# Patient Record
Sex: MALE | Race: BLACK OR AFRICAN AMERICAN | URBAN - METROPOLITAN AREA
[De-identification: names, ages, dates, MRNs, and addresses within clinical notes are randomized per-mention and may not be internally consistent; named-entity substitution may affect disease eponyms.]

---

## 2021-03-02 ENCOUNTER — HISTORICAL (OUTPATIENT)
Dept: ADMINISTRATIVE | Facility: HOSPITAL | Age: 31
End: 2021-03-02

## 2021-03-02 LAB — SARS-COV-2 RNA RESP QL NAA+PROBE: NOT DETECTED

## 2021-03-04 ENCOUNTER — HISTORICAL (OUTPATIENT)
Dept: SURGERY | Facility: HOSPITAL | Age: 31
End: 2021-03-04

## 2021-03-19 ENCOUNTER — HISTORICAL (OUTPATIENT)
Dept: ADMINISTRATIVE | Facility: HOSPITAL | Age: 31
End: 2021-03-19

## 2021-04-19 ENCOUNTER — HISTORICAL (OUTPATIENT)
Dept: ADMINISTRATIVE | Facility: HOSPITAL | Age: 31
End: 2021-04-19

## 2022-04-11 ENCOUNTER — HISTORICAL (OUTPATIENT)
Dept: ADMINISTRATIVE | Facility: HOSPITAL | Age: 32
End: 2022-04-11

## 2022-04-27 VITALS
SYSTOLIC BLOOD PRESSURE: 136 MMHG | HEIGHT: 71 IN | DIASTOLIC BLOOD PRESSURE: 88 MMHG | BODY MASS INDEX: 20.96 KG/M2 | WEIGHT: 149.69 LBS

## 2022-04-30 NOTE — H&P
Patient:   Doc Scales            MRN: [id number]            FIN: [id number]               Age:   30 years     Sex:  Male     :  1990   Associated Diagnoses:   None   Author:   Arron Dexter MD      Patient presents today for surgery. There are no changes to history and physical documented at previous clinic appointment.    Patient will undergo surgery as detailed in formal history and physical.      Arron Dexter MD

## 2022-04-30 NOTE — PROGRESS NOTES
Patient:   Doc Scales            MRN: [id number]            FIN: [id number]               Age:   30 years     Sex:  Male     :  1990   Associated Diagnoses:   None   Author:   Dewey Epperson MD      Patient came to clinic today for assessment of his right hand.  He is now approximately 4 weeks status post open reduction and percutaneous pinning of his right fourth and fifth carpometacarpal fracture/dislocation.  He reports that over the last couple days he felt as though his splint had come loose.  There was sliding on his arm.  A new splint was applied to the patient's arm in clinic today.

## 2022-04-30 NOTE — OP NOTE
DATE OF SURGERY:    03/04/2021    SURGEON:  Tom De Leon MD    attending physician:  Dewey Epperson MD    PREOPERATIVE DIAGNOSES:    1. Fracture-dislocation, right 4th metacarpal.  2. Dislocation, right 5th metacarpal.    POSTOPERATIVE DIAGNOSES:    1. Fracture-dislocation, right 4th metacarpal.  2. Dislocation, right 5th metacarpal.    PROCEDURES:    1. Open reduction, percutaneous fixation, right fracture-dislocation 4th metacarpal.  2. Open reduction, percutaneous fixation, right 5th metacarpal dislocation.    INDICATION FOR PROCEDURE:  Doc Scales is a 30-year-old male who struck an object by punching and he suffered a terrible dislocation of 4th and 5th metacarpals with a fracture of the 4th metacarpal which is displaced and non-healed.  He presents for repair.    ANESTHESIA:  General.    COMPLICATIONS:  None.    PROCEDURE IN DETAIL:  The patient was endotracheally intubated, prepped and draped in the usual sterile fashion.  Esmarch was used to exsanguinate the right upper extremity.  Tourniquet was inflated to 250 mmHg.  I first began by making a dorsal incision along the 4th and 5th metacarpals using a 15 blade scalpel.  The soft tissue was dissected sharply.  There was a large amount of callus which was removed from the 4th and 5th metacarpal space as it articulated with the carpal bones.  The fracture-dislocation was reduced under manual compression, and in a similar fashion, under fluoroscopic guidance, the callus and debris were removed from the 5th metacarpal fracture-dislocation under open control.  We then reduced these and placed two 0.045 K-wires in a retrograde fashion to reduce and fixate percutaneously the 4th and 5th metacarpal fracture-dislocation.  After this was completed, we then bent the K-wires, closed the incision using nylons.  Sterile dressing was applied.  The tourniquet was released.  Fingers were pink at the end of the case.         I was scrubbed and present for the  entire procedure.        ______________________________  MD ANDRES León/CRISTI  DD:  03/04/2021  Time:  11:05AM  DT:  03/04/2021  Time:  11:26AM  Job #:  [id number]

## 2024-04-12 ENCOUNTER — HOSPITAL ENCOUNTER (EMERGENCY)
Facility: HOSPITAL | Age: 34
Discharge: HOME | End: 2024-04-12
Attending: FAMILY MEDICINE

## 2024-04-12 VITALS
WEIGHT: 150 LBS | HEART RATE: 55 BPM | RESPIRATION RATE: 18 BRPM | SYSTOLIC BLOOD PRESSURE: 126 MMHG | DIASTOLIC BLOOD PRESSURE: 89 MMHG | BODY MASS INDEX: 21 KG/M2 | OXYGEN SATURATION: 98 % | TEMPERATURE: 98 F

## 2024-04-12 DIAGNOSIS — S53.105A ELBOW DISLOCATION, LEFT, INITIAL ENCOUNTER: Primary | ICD-10-CM

## 2024-04-12 DIAGNOSIS — M25.529 ELBOW PAIN: ICD-10-CM

## 2024-04-12 PROCEDURE — 63600175 PHARM REV CODE 636 W HCPCS: Performed by: FAMILY MEDICINE

## 2024-04-12 PROCEDURE — 99285 EMERGENCY DEPT VISIT HI MDM: CPT | Mod: 25

## 2024-04-12 PROCEDURE — 96374 THER/PROPH/DIAG INJ IV PUSH: CPT

## 2024-04-12 PROCEDURE — 96361 HYDRATE IV INFUSION ADD-ON: CPT

## 2024-04-12 PROCEDURE — 24600 TX CLSD ELBOW DISLC W/O ANES: CPT | Mod: LT

## 2024-04-12 PROCEDURE — 25000003 PHARM REV CODE 250: Performed by: FAMILY MEDICINE

## 2024-04-12 RX ORDER — MORPHINE SULFATE 4 MG/ML
4 INJECTION, SOLUTION INTRAMUSCULAR; INTRAVENOUS
Status: COMPLETED | OUTPATIENT
Start: 2024-04-12 | End: 2024-04-12

## 2024-04-12 RX ORDER — ONDANSETRON HYDROCHLORIDE 2 MG/ML
4 INJECTION, SOLUTION INTRAVENOUS
Status: COMPLETED | OUTPATIENT
Start: 2024-04-12 | End: 2024-04-12

## 2024-04-12 RX ORDER — HYDROCODONE BITARTRATE AND ACETAMINOPHEN 7.5; 325 MG/1; MG/1
1 TABLET ORAL EVERY 6 HOURS PRN
Qty: 15 TABLET | Refills: 0 | Status: SHIPPED | OUTPATIENT
Start: 2024-04-12

## 2024-04-12 RX ORDER — ETOMIDATE 2 MG/ML
10 INJECTION INTRAVENOUS
Status: COMPLETED | OUTPATIENT
Start: 2024-04-12 | End: 2024-04-12

## 2024-04-12 RX ORDER — IBUPROFEN 800 MG/1
800 TABLET ORAL EVERY 8 HOURS PRN
Qty: 30 TABLET | Refills: 0 | Status: SHIPPED | OUTPATIENT
Start: 2024-04-12 | End: 2024-04-22

## 2024-04-12 RX ADMIN — MORPHINE SULFATE 4 MG: 4 INJECTION, SOLUTION INTRAMUSCULAR; INTRAVENOUS at 05:04

## 2024-04-12 RX ADMIN — SODIUM CHLORIDE 1000 ML: 9 INJECTION, SOLUTION INTRAVENOUS at 05:04

## 2024-04-12 RX ADMIN — ETOMIDATE 10 MG: 2 INJECTION INTRAVENOUS at 05:04

## 2024-04-12 RX ADMIN — ETOMIDATE 10 MG: 2 INJECTION INTRAVENOUS at 06:04

## 2024-04-12 RX ADMIN — ONDANSETRON 4 MG: 2 INJECTION INTRAMUSCULAR; INTRAVENOUS at 05:04

## 2024-04-12 NOTE — Clinical Note
"Doc TREJO" Yordy was seen and treated in our emergency department on 4/12/2024.  He may return to work on 04/15/2024.       If you have any questions or concerns, please don't hesitate to call.       RN    "

## 2024-04-12 NOTE — ED PROVIDER NOTES
Encounter Date: 4/12/2024       History     Chief Complaint   Patient presents with    Joint Swelling     Transfer from [facility] with L elbow fracture     Patient is a 33-year-old gentleman presents room transferred from [facility] for orthopedic evaluation.  Patient was sustained an injury dislocated in his left elbow, they were concerned because reports associated fracture.    The history is provided by the patient.     Review of patient's allergies indicates:  No Known Allergies  History reviewed. No pertinent past medical history.  History reviewed. No pertinent surgical history.  History reviewed. No pertinent family history.     Review of Systems   Constitutional:  Negative for chills, fatigue and fever.   HENT:  Negative for ear pain, rhinorrhea and sore throat.    Eyes:  Negative for photophobia and pain.   Respiratory:  Negative for cough, shortness of breath and wheezing.    Cardiovascular:  Negative for chest pain.   Gastrointestinal:  Negative for abdominal pain, diarrhea, nausea and vomiting.   Genitourinary:  Negative for dysuria.   Neurological:  Negative for dizziness, weakness and headaches.   All other systems reviewed and are negative.      Physical Exam     Initial Vitals [04/12/24 0451]   BP Pulse Resp Temp SpO2   127/83 (!) 54 16 98.2 °F (36.8 °C) (!) 94 %      MAP       --         Physical Exam    Nursing note and vitals reviewed.  Constitutional: He appears well-developed and well-nourished.   HENT:   Head: Normocephalic and atraumatic.   Mouth/Throat: Oropharynx is clear and moist.   Eyes: EOM are normal. Pupils are equal, round, and reactive to light.   Neck: Neck supple.   Normal range of motion.  Cardiovascular:  Normal rate, regular rhythm, normal heart sounds and intact distal pulses.     Exam reveals no gallop and no friction rub.       No murmur heard.  Pulmonary/Chest: Breath sounds normal. No respiratory distress.   Abdominal: Abdomen is soft. Bowel sounds are normal. He exhibits  no distension. There is no abdominal tenderness.   Musculoskeletal:         General: Normal range of motion.      Cervical back: Normal range of motion and neck supple.      Comments: Currently in sling     Neurological: He is alert and oriented to person, place, and time. He has normal strength.   Skin: Skin is warm and dry.   Psychiatric: He has a normal mood and affect. His behavior is normal. Judgment and thought content normal.         ED Course   Orthopedic Injury    Date/Time: 4/12/2024 6:09 AM    Performed by: Humberto Ledbetter MD  Authorized by: Humberto Ledbetter MD    Location procedure was performed:  [facility] EMERGENCY DEPARTMENT  Injury:     Injury location:  Elbow    Injury type:  Fracture      Pre-procedure assessment:     Distal perfusion: normal      Neurological function: normal      Range of motion: normal      Patient sedated?: Yes      ASA Class:  Class 1 - Heathy patient. No medical history.    Mallampati Score:  Class 1 - Visualization of the soft palate, fauces, uvula, and anterior/posterior pillars.  Date/Time of last solid:  4/11/2024 11:00 PM    Date/Time of last fluid:  4/11/2024 11:00 PM    Patient/Family history of anesthesia or sedation complications: Yes      Sedation type: moderate (conscious) sedation      Sedation:  Etomidate    Analgesia:  Morphine      Selections made in this section will also lock the Injury type section above.:     Manipulation performed?: Yes      Skeletal traction used?: Yes      Reduction successful?: Yes      Immobilization:  Splint    Splint type: posterior long arm.  Post-procedure assessment:     Distal perfusion: normal      Neurological function: normal      Labs Reviewed - No data to display       Imaging Results              X-Ray Elbow Complete Left (Preliminary result)  Result time 04/12/24 06:11:43      Wet Read by Humberto Ledbetter MD (04/12/24 06:11:43, [facility] - Emergency Dept, Emergency Medicine)    Successful  reduction                                     Medications   sodium chloride 0.9% bolus 1,000 mL 1,000 mL (1,000 mLs Intravenous New Bag 4/12/24 0554)   etomidate injection 10 mg (has no administration in time range)   etomidate injection 10 mg (10 mg Intravenous Given 4/12/24 0553)   ondansetron injection 4 mg (4 mg Intravenous Given 4/12/24 0546)   morphine injection 4 mg (4 mg Intravenous Given 4/12/24 0546)     Medical Decision Making  33-year-old gentleman presenting to the emergency room from [facility] with a left elbow dislocation; discussed with Dr. Riggs who has reviewed the images.  Recommends reduction and outpatient followup in clinic.    Amount and/or Complexity of Data Reviewed  Radiology: ordered.    Risk  Prescription drug management.               ED Course as of 04/12/24 0612 Fri Apr 12, 2024   0536 Discussed with Dr. Riggs.  Recommends reduction and outpatient followup. [MW]   0611 Successful reduction.  Stable for discharge to home.  Will followup outpatient with Ortho. [MW]      ED Course User Index  [MW] Humberto Ledbetter MD                           Clinical Impression:  Final diagnoses:  [S53.105A] Elbow dislocation, left, initial encounter (Primary)  [M25.529] Elbow pain  [M25.529] Elbow pain - post reduction          ED Disposition Condition    Discharge Stable          ED Prescriptions       Medication Sig Dispense Start Date End Date Auth. Provider    HYDROcodone-acetaminophen (NORCO) 7.5-325 mg per tablet Take 1 tablet by mouth every 6 (six) hours as needed for Pain. 15 tablet 4/12/2024 -- Humberto Ledbetter MD    ibuprofen (ADVIL,MOTRIN) 800 MG tablet Take 1 tablet (800 mg total) by mouth every 8 (eight) hours as needed for Pain. 30 tablet 4/12/2024 4/22/2024 Humberto Ledbetter MD          Follow-up Information       Follow up With Specialties Details Why Contact Reymundo Ryder MD Orthopedic Surgery  Call to schedule followup appointment. [address]  [facility] [id number]  [phone]      [facility] - Emergency Dept Emergency Medicine  As needed, If symptoms worsen [address] [facility] [address]  [phone]             Humberto Ledbetter MD  04/12/24 0612

## 2024-05-30 DIAGNOSIS — S53.105A ELBOW DISLOCATION, LEFT, INITIAL ENCOUNTER: Primary | ICD-10-CM

## 2024-05-30 DIAGNOSIS — S42.135D CLOSED NONDISPLACED FRACTURE OF CORACOID PROCESS OF LEFT SHOULDER WITH ROUTINE HEALING, SUBSEQUENT ENCOUNTER: ICD-10-CM
